# Patient Record
Sex: FEMALE | Race: WHITE
[De-identification: names, ages, dates, MRNs, and addresses within clinical notes are randomized per-mention and may not be internally consistent; named-entity substitution may affect disease eponyms.]

---

## 2019-03-20 ENCOUNTER — HOSPITAL ENCOUNTER (EMERGENCY)
Dept: HOSPITAL 63 - ER | Age: 84
Discharge: HOME | End: 2019-03-20
Payer: MEDICARE

## 2019-03-20 VITALS
DIASTOLIC BLOOD PRESSURE: 44 MMHG | SYSTOLIC BLOOD PRESSURE: 126 MMHG | SYSTOLIC BLOOD PRESSURE: 126 MMHG | DIASTOLIC BLOOD PRESSURE: 44 MMHG | SYSTOLIC BLOOD PRESSURE: 126 MMHG | DIASTOLIC BLOOD PRESSURE: 44 MMHG

## 2019-03-20 VITALS — HEIGHT: 62 IN | BODY MASS INDEX: 26.5 KG/M2 | WEIGHT: 144 LBS

## 2019-03-20 DIAGNOSIS — N39.0: ICD-10-CM

## 2019-03-20 DIAGNOSIS — Z88.0: ICD-10-CM

## 2019-03-20 DIAGNOSIS — K80.20: Primary | ICD-10-CM

## 2019-03-20 DIAGNOSIS — Z91.012: ICD-10-CM

## 2019-03-20 DIAGNOSIS — K42.9: ICD-10-CM

## 2019-03-20 DIAGNOSIS — Z91.013: ICD-10-CM

## 2019-03-20 DIAGNOSIS — Z90.81: ICD-10-CM

## 2019-03-20 DIAGNOSIS — Z90.710: ICD-10-CM

## 2019-03-20 LAB
% ATYL: 4 % (ref 0–0)
% LYMPHS: 43 % (ref 24–48)
% MONOS: 13 % (ref 0–10)
% SEGS: 38 % (ref 35–66)
ACANTHOCYTES BLD QL SMEAR: (no result)
ALBUMIN SERPL-MCNC: 3.9 G/DL (ref 3.4–5)
ALBUMIN/GLOB SERPL: 1.1 {RATIO} (ref 1–1.7)
ALP SERPL-CCNC: 149 U/L (ref 46–116)
ALT SERPL-CCNC: 29 U/L (ref 14–59)
ANION GAP SERPL CALC-SCNC: 11 MMOL/L (ref 6–14)
ANISOCYTOSIS BLD QL SMEAR: SLIGHT
APTT PPP: YELLOW S
AST SERPL-CCNC: 40 U/L (ref 15–37)
BACTERIA #/AREA URNS HPF: (no result) /HPF
BASOPHILS # BLD AUTO: 0.1 X10^3/UL (ref 0–0.2)
BASOPHILS NFR BLD AUTO: 1 % (ref 0–3)
BASOPHILS NFR BLD: 1 % (ref 0–3)
BILIRUB SERPL-MCNC: 0.3 MG/DL (ref 0.2–1)
BILIRUB UR QL STRIP: (no result)
BUN/CREAT SERPL: 14 (ref 6–20)
CA-I SERPL ISE-MCNC: 18 MG/DL (ref 7–20)
CALCIUM SERPL-MCNC: 9.8 MG/DL (ref 8.5–10.1)
CHLORIDE SERPL-SCNC: 104 MMOL/L (ref 98–107)
CO2 SERPL-SCNC: 27 MMOL/L (ref 21–32)
CREAT SERPL-MCNC: 1.3 MG/DL (ref 0.6–1)
EOSINOPHIL NFR BLD AUTO: 1 % (ref 0–5)
EOSINOPHIL NFR BLD: 0.2 X10^3/UL (ref 0–0.7)
EOSINOPHIL NFR BLD: 4 % (ref 0–3)
ERYTHROCYTE [DISTWIDTH] IN BLOOD BY AUTOMATED COUNT: 16.7 % (ref 11.5–14.5)
FIBRINOGEN PPP-MCNC: (no result) MG/DL
GFR SERPLBLD BASED ON 1.73 SQ M-ARVRAT: 39.3 ML/MIN
GLOBULIN SER-MCNC: 3.6 G/DL (ref 2.2–3.8)
GLUCOSE SERPL-MCNC: 92 MG/DL (ref 70–99)
GLUCOSE UR STRIP-MCNC: (no result) MG/DL
HCT VFR BLD CALC: 38 % (ref 36–47)
HGB BLD-MCNC: 12.4 G/DL (ref 12–15.5)
HYPOCHROMIA BLD QL SMEAR: SLIGHT
LIPASE: 263 U/L (ref 73–393)
LYMPHOCYTES # BLD: 2.6 X10^3/UL (ref 1–4.8)
LYMPHOCYTES NFR BLD AUTO: 50 % (ref 24–48)
MCH RBC QN AUTO: 29 PG (ref 25–35)
MCHC RBC AUTO-ENTMCNC: 33 G/DL (ref 31–37)
MCV RBC AUTO: 90 FL (ref 79–100)
MONO #: 0.8 X10^3/UL (ref 0–1.1)
MONOCYTES NFR BLD: 15 % (ref 0–9)
NEUT #: 1.6 X10^3UL (ref 1.8–7.7)
NEUTROPHILS NFR BLD AUTO: 30 % (ref 31–73)
NITRITE UR QL STRIP: (no result)
PLATELET # BLD AUTO: 271 X10^3/UL (ref 140–400)
PLATELET # BLD EST: ADEQUATE 10*3/UL
POIKILOCYTOSIS BLD QL SMEAR: (no result)
POTASSIUM SERPL-SCNC: 4 MMOL/L (ref 3.5–5.1)
PROT SERPL-MCNC: 7.5 G/DL (ref 6.4–8.2)
RBC # BLD AUTO: 4.23 X10^6/UL (ref 3.5–5.4)
RBC #/AREA URNS HPF: 0 /HPF (ref 0–2)
SODIUM SERPL-SCNC: 142 MMOL/L (ref 136–145)
SP GR UR STRIP: 1.02
SQUAMOUS #/AREA URNS LPF: (no result) /LPF
TARGETS BLD QL SMEAR: (no result)
UROBILINOGEN UR-MCNC: 1 MG/DL
WBC # BLD AUTO: 5.3 X10^3/UL (ref 4–11)
WBC #/AREA URNS HPF: (no result) /HPF (ref 0–4)

## 2019-03-20 PROCEDURE — 80053 COMPREHEN METABOLIC PANEL: CPT

## 2019-03-20 PROCEDURE — 83690 ASSAY OF LIPASE: CPT

## 2019-03-20 PROCEDURE — 85025 COMPLETE CBC W/AUTO DIFF WBC: CPT

## 2019-03-20 PROCEDURE — 96374 THER/PROPH/DIAG INJ IV PUSH: CPT

## 2019-03-20 PROCEDURE — 36415 COLL VENOUS BLD VENIPUNCTURE: CPT

## 2019-03-20 PROCEDURE — 85007 BL SMEAR W/DIFF WBC COUNT: CPT

## 2019-03-20 PROCEDURE — 85610 PROTHROMBIN TIME: CPT

## 2019-03-20 PROCEDURE — 74176 CT ABD & PELVIS W/O CONTRAST: CPT

## 2019-03-20 PROCEDURE — 87086 URINE CULTURE/COLONY COUNT: CPT

## 2019-03-20 PROCEDURE — 84484 ASSAY OF TROPONIN QUANT: CPT

## 2019-03-20 PROCEDURE — 81001 URINALYSIS AUTO W/SCOPE: CPT

## 2019-03-20 PROCEDURE — 83880 ASSAY OF NATRIURETIC PEPTIDE: CPT

## 2019-03-20 PROCEDURE — 99284 EMERGENCY DEPT VISIT MOD MDM: CPT

## 2019-03-20 PROCEDURE — 93005 ELECTROCARDIOGRAM TRACING: CPT

## 2019-03-20 PROCEDURE — 87186 SC STD MICRODIL/AGAR DIL: CPT

## 2019-03-20 PROCEDURE — 76705 ECHO EXAM OF ABDOMEN: CPT

## 2019-03-20 NOTE — RAD
Indication:nausea RUQ pain

TECHNIQUE: Grayscale, color Doppler and spectral waveform is of the 

abdomen obtained.

 

COMPARISON:CT abdomen pelvis from same day

 

FINDINGS: Visualized pancreas is within normal limits. IVC is within 

normal limits. Diffusely stone filled gallbladder is seen with wall echo 

shadow complex. No pericholecystic fluid. Main portal vein is patent with 

hepatopedal flow. CBD measures 5 mm in diameter and is top normal in size 

for patient's age. Mild diffuse intrahepatic biliary duct dilation seen. 

Liver measures 14 cm in longest dimension and is normal in size and 

echogenicity. Right kidney measures 9.5 cm in length without 

hydronephrosis. 

 

IMPRESSION:

1. Completely stone filled gallbladder. If concern for cystic duct 

obstruction is high, please consider HIDA scan.

2. Mild diffuse intrahepatic biliary duct dilation. CBD is nondilated.

 

Electronically signed by: Flip Graham DO (3/20/2019 4:33 PM) [facility]

## 2019-03-20 NOTE — RAD
EXAM: CT Abdomen and Pelvis without IV contrast

 

CLINICAL HISTORY: Right and upper sided abdominal pain, nausea today. 

 

COMPARISON: CT chest abdomen pelvis 5/26/2015

 

TECHNIQUE: Helical CT of the abdomen and pelvis without intravenous 

contrast.  Axial, coronal and sagittal reformatted images were generated.

 

PQRS compliance statement - One or more of the following individualized 

dose reduction techniques were utilized for this study:

1.  Automated exposure control

2.  Adjustment of the mA and/or kV according to patient size

3.  Use of iterative reconstruction technique

 

FINDINGS:

Lack of intravenous contrast limits evaluation of solid organs, 

vasculature, and lymph nodes. 

 

Lower chest: 

Prominently reticular opacities in the lower lobes likely 

scarring/atelectasis. Mild emphysematous changes are seen. 3-4 mm 

bilateral lung base nodules are seen, stable to prior CT 5/26/2015. Small 

hiatal hernia.

 

Abdomen and Pelvis: 

No focal liver lesion. High density material within the gallbladder likely

numerous gallstones. No definite wall thickening or pericholecystic fluid 

is seen. No definite intra or extra hepatic ductal dilatation is seen. 

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

 

Adrenal glands and pancreas are unremarkable.

 

 No definite renal tract calculus. No focal renal lesion. No 

hydronephrosis. Diffuse bladder wall thickening with associated fat 

infiltration likely cystitis.

 

Appendix is normal.  No small or large bowel dilatation. Moderate colonic 

stool content. 

 

Tiny fat-containing periumbilical hernia.

 

No abdominal or pelvic ascites. No abdominal or pelvic lymphadenopathy.

 

Bones: 

Osseous structures are grossly unremarkable. Multilevel degenerative 

changes of the spine are seen. Diffusely decreased bone mineral density. 

Anterolisthesis of L4 on L5 is seen.

 

IMPRESSION: 

Marked cholelithiasis without CT evidence for acute cholecystitis.

Bladder wall thickening and associated fat infiltration may be seen with 

cystitis. This can be correlated with urinalysis.

Moderate colonic stool content. No evidence for bowel obstruction.

 

Electronically signed by: Ac Joshi MD (3/20/2019 4:38 PM) [facility]

## 2019-03-20 NOTE — EKG
[facility] [address], [place] [address]

Test Date:    2019               Test Time:    15:01:34

Pat Name:     ADDY ATKINS          Department:   

Patient ID:   [id number]           Room:          

Gender:       F                        Technician:   RINA

:          1935               Requested By: CHAZ PRINCE

Order Number: [id number]            Reading MD:   Evin Rice MD

                                 Measurements

Intervals                              Axis          

Rate:         71                       P:            66

MS:           182                      QRS:          -34

QRSD:         84                       T:            52

QT:           378                                    

QTc:          411                                    

                           Interpretive Statements

SINUS RHYTHM

ABNORMAL LEFT AXIS DEVIATION

LEFT ANTERIOR FASCICULAR BLOCK



Electronically Signed On 3- 16:32:52 CDT by Evin Rice MD

## 2019-03-20 NOTE — PHYS DOC
Past History


Past Medical History:  Cancer


Additional Past Medical Histor:  meningitis


Past Surgical History:  Hysterectomy, Knee Replacement, Spleenectomy, Other


Additional Past Surgical Histo:  feeding tube


Smoking:  Non-smoker


Alcohol Use:  Rarely


Drug Use:  None





Adult General


Chief Complaint


Chief Complaint:  NAUSEA/VOMITING/DIARRHEA





HPI


HPI





Patient is a 83-year-old female who presents with mid upper abdominal pain 

since 21 February 2019. This is been intermittent. Sometimes associated with 

food. No specific association with fatty foods or milk. Some nausea, no vomiting

, no diarrhea. There was a fever at the onset. Patient had an exacerbation of 

the pain but more in her right upper quadrant shortly before arriving today. 

That sharp discomfort lasted only a few seconds. There is no association with 

exertion. There is been no trauma. Patient notes that she's lost approximately 

3 pounds of weight in this past month. Discomfort is mild to moderate.[]





Review of Systems


Review of Systems





Constitutional: Denies fever or chills []


Eyes: Denies change in visual acuity, redness, or eye pain []


HENT: Denies nasal congestion or sore throat []


Respiratory: Denies cough or shortness of breath []


Cardiovascular: No chest pain or palpitations[]


GI: See history of present illness[]


: Denies dysuria or hematuria []


Musculoskeletal: Denies back pain or joint pain []


Integument: Denies rash or skin lesions []


Neurologic: Denies headache, focal weakness or sensory changes []


Endocrine: Denies polyuria or polydipsia []





All other systems were reviewed and found to be within normal limits, except as 

documented in this note.





Current Medications


Current Medications





Current Medications








 Medications


  (Trade)  Dose


 Ordered  Sig/Sena  Start Time


 Stop Time Status Last Admin


Dose Admin


 


 Hyoscyamine


  (Anaspaz)  0.125 mg  1X  ONCE  3/20/19 15:00


 3/20/19 15:01 DC 3/20/19 15:01


0.125 MG


 


 Prochlorperazine


 Edisylate


  (Compazine)  5 mg  1X  ONCE  3/20/19 15:00


 3/20/19 15:01 DC 3/20/19 15:01


5 MG











Allergies


Allergies





Allergies








Coded Allergies Type Severity Reaction Last Updated Verified


 


  egg Allergy Intermediate  1/6/17 Yes


 


  shellfish derived Allergy Intermediate  1/6/17 Yes


 


  Penicillins Allergy Mild Anxiety 1/6/17 Yes











Physical Exam


Physical Exam





Constitutional: Well developed, well nourished, no acute distress, non-toxic 

appearance. []


HENT: Normocephalic, atraumatic, bilateral external ears normal, oropharynx 

moist, no oral exudates, nose normal. []


Eyes: PERRLA, EOMI, conjunctiva normal, no discharge. [] 


Neck: Normal range of motion, no tenderness, supple, no stridor. [] 


Cardiovascular:Heart rate regular rhythm, no murmur []


Lungs & Thorax:  Bilateral breath sounds clear to auscultation []


Abdomen: Bowel sounds normal, soft, mild epigastric tenderness, no rebound, no 

guarding, no rigidity, sits up without any difficulty, no McBurney's point 

tenderness, no Gunderson's sign, no masses, no pulsatile masses. [] 


Skin: Warm, dry, no erythema, no rash. [] 


Back: No tenderness, no CVA tenderness. [] 


Extremities: No tenderness, no cyanosis, no clubbing, ROM intact, no edema. [] 


Neurologic: Alert and oriented X 3, normal motor function, normal sensory 

function, no focal deficits noted. []


Psychologic: Affect normal, judgement normal, mood normal. []





Current Patient Data


Vital Signs





 Vital Signs








  Date Time  Temp Pulse Resp B/P (MAP) Pulse Ox O2 Delivery O2 Flow Rate FiO2


 


3/20/19 14:31 98.4 80 22  97 Room Air  











EKG


EKG


EKG shows a sinus rhythm at 71 bpm, left axis deviation, QTC of 411 ms, no ST 

elevation, no acute changes when compared with EKG of January 6, 2017. 

Interpreted by me at 1505[]





Radiology/Procedures


Radiology/Procedures


PROCEDURE: ABDOMEN LTD





Indication:nausea RUQ pain


TECHNIQUE: Grayscale, color Doppler and spectral waveform is of the 


abdomen obtained.


 


COMPARISON:CT abdomen pelvis from same day


 


FINDINGS: Visualized pancreas is within normal limits. IVC is within 


normal limits. Diffusely stone filled gallbladder is seen with wall echo 


shadow complex. No pericholecystic fluid. Main portal vein is patent with 


hepatopedal flow. CBD measures 5 mm in diameter and is top normal in size 


for patient's age. Mild diffuse intrahepatic biliary duct dilation seen. 


Liver measures 14 cm in longest dimension and is normal in size and 


echogenicity. Right kidney measures 9.5 cm in length without 


hydronephrosis. 


 


IMPRESSION:


1. Completely stone filled gallbladder. If concern for cystic duct 


obstruction is high, please consider HIDA scan.


2. Mild diffuse intrahepatic biliary duct dilation. CBD is nondilated.











EXAM: CT Abdomen and Pelvis without IV contrast


 


CLINICAL HISTORY: Right and upper sided abdominal pain, nausea today. 


 


COMPARISON: CT chest abdomen pelvis 5/26/2015


 


TECHNIQUE: Helical CT of the abdomen and pelvis without intravenous 


contrast.  Axial, coronal and sagittal reformatted images were generated.


 


PQRS compliance statement - One or more of the following individualized 


dose reduction techniques were utilized for this study:


1.  Automated exposure control


2.  Adjustment of the mA and/or kV according to patient size


3.  Use of iterative reconstruction technique


 


FINDINGS:


Lack of intravenous contrast limits evaluation of solid organs, 


vasculature, and lymph nodes. 


 


Lower chest: 


Prominently reticular opacities in the lower lobes likely 


scarring/atelectasis. Mild emphysematous changes are seen. 3-4 mm 


bilateral lung base nodules are seen, stable to prior CT 5/26/2015. Small 


hiatal hernia.


 


Abdomen and Pelvis: 


No focal liver lesion. High density material within the gallbladder likely


numerous gallstones. No definite wall thickening or pericholecystic fluid 


is seen. No definite intra or extra hepatic ductal dilatation is seen. 


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


 


Adrenal glands and pancreas are unremarkable.


 


 No definite renal tract calculus. No focal renal lesion. No 


hydronephrosis. Diffuse bladder wall thickening with associated fat 


infiltration likely cystitis.


 


Appendix is normal.  No small or large bowel dilatation. Moderate colonic 


stool content. 


 


Tiny fat-containing periumbilical hernia.


 


No abdominal or pelvic ascites. No abdominal or pelvic lymphadenopathy.


 


Bones: 


Osseous structures are grossly unremarkable. Multilevel degenerative 


changes of the spine are seen. Diffusely decreased bone mineral density. 


Anterolisthesis of L4 on L5 is seen.


 


IMPRESSION: 


Marked cholelithiasis without CT evidence for acute cholecystitis.


Bladder wall thickening and associated fat infiltration may be seen with 


cystitis. This can be correlated with urinalysis.


Moderate colonic stool content. No evidence for bowel obstruction.[]





Course & Med Decision Making


Course & Med Decision Making


Pertinent Labs and Imaging studies reviewed. (See chart for details)





ED course: Patient arrived, was placed in bed, and tolerated exam well. Due to 

her shellfish allergy she was given an oral water contrast which she tolerated 

well. She was transported to and from CT and ultrasound without any 

complications. After the return of the laboratory and imaging findings, these 

were discussed with her and her son-in-law both of whom voiced understanding. 

All questions were answered. Patient was discharged in improved condition.





Medical decision making: Patient appears to have cholelithiasis that is 

symptomatic. No evidence of cholecystitis, no evidence of obstruction, no 

evidence of pancreatitis nor acute coronary syndrome. No evidence of oral 

intake intolerance. No evidence of pyelonephritis.[]





Dragon Disclaimer


Dragon Disclaimer


This electronic medical record was generated, in whole or in part, using a 

voice recognition dictation system.





Departure


Departure:


Impression:  


 Primary Impression:  


 Cholelithiasis


 Additional Impression:  


 Urinary tract infection


Disposition:  01 HOME, SELF-CARE


Condition:  IMPROVED


Referrals:  


FIDEL MELTON MD (PCP)


Follow-up in 2 days





MARLO GRAYSON MD


GI specialist, call tomorrow to set follow up appointment





UNRULY MENDOZA MD


Surgeon, call tomorrow to set follow up appointment


Patient Instructions:  Cholelithiasis, Urinary Tract Infection





Additional Instructions:  


Follow-up with your primary care physician in 2 days. Call the GI specialist as 

well as ortega surgeon for follow-up of your cholelithiasis. Return to the ER if 

worsening pain, unable to tolerate liquids, or any other concerns.


Scripts


Sulfamethoxazole/Trimethoprim (BACTRIM DS TABLET) 1 Each Tablet


1 TAB PO BID for urinary tract infection, #20 TAB


   Prov: CHAZ PRINCE DO         3/20/19 


Ondansetron Hcl (ZOFRAN) 4 Mg Tablet


1 TAB PO Q6HRS for nausea or vomiting, #20 TAB


   Prov: CHAZ PRINCE DO         3/20/19 


Hyoscyamine Sulfate (LEVSIN) 0.125 Mg Tablet


0.125 MG PO QID for abdominal pain/cramping, #30 TAB


   Prov: CHAZ PRINCE DO         3/20/19





Problem Qualifiers








 Primary Impression:  


 Cholelithiasis


 Cholelithiasis location:  gallbladder  Cholecystitis presence:  without 

cholecystitis  Biliary obstruction:  without biliary obstruction  Qualified 

Codes:  K80.20 - Calculus of gallbladder without cholecystitis without 

obstruction


 Additional Impression:  


 Urinary tract infection


 Urinary tract infection type:  site unspecified  Hematuria presence:  without 

hematuria  Qualified Codes:  N39.0 - Urinary tract infection, site not specified








CHAZ PRINCE DO Mar 20, 2019 15:05

## 2020-08-11 ENCOUNTER — HOSPITAL ENCOUNTER (OUTPATIENT)
Dept: HOSPITAL 63 - ECHO | Age: 85
Discharge: HOME | End: 2020-08-11
Payer: MEDICARE

## 2020-08-11 DIAGNOSIS — I34.0: Primary | ICD-10-CM

## 2020-08-11 PROCEDURE — 93306 TTE W/DOPPLER COMPLETE: CPT

## 2020-08-11 NOTE — CARD
MR#: [id number]

Account#: [id number]

Accession#: [id number]

Date of Study: 08/11/2020

Ordering Physician: KETURAH RICE, 

Referring Physician: KETURAH RICE, 

Tech: Arielle Carter





--------------- APPROVED REPORT --------------





EXAM: Two-dimensional and M-mode echocardiogram with Doppler and color Doppler.



Other Information 

Quality : AverageHR: 67bpm



INDICATION

Mitral Valve Disease 



2D DIMENSIONS 

Left Atrium(2D)3.0 (1.6-4.0cm)IVSd0.9 (0.7-1.1cm)

Aortic Root(2D)2.4 (2.0-3.7cm)LVDd4.0 (3.9-5.9cm)

LVOT Diameter1.8 (1.8-2.4cm)PWd1.0 (0.7-1.1cm)

LVDs2.6 (2.5-4.0cm)FS (%) 34.4 %

SV45.0 mlLVEF(%)64.1 (>50%)



Aortic Valve

AoV Peak Robi.144.6cm/sAoV VTI35.8cm

AO Peak GR.8.4mmHgLVOT Peak Robi.93.2cm/s

LVOT  VTI 22.76cmAO Mean GR.5mmHg

GIANNA (VMAX)1.55bd5VHW   (VTI)1.67cm2



Mitral Valve

MV E Kyfyhnix935.1cm/sMV E Peak Gr.171mmHg

MV DECEL IBZD069udHM A Dsjspajr214.5cm/s

MV E Mean Gr.3mmHgE/A  Ratio0.8



Pulmonary Valve

PV Peak Aqsilvtg17.7cm/sPV Peak Grad.4mmHg



Tricuspid Valve

TR P. Psasoiml613vo/sRAP EQXANMUS1rxDa

TR Peak Gr.88hdEvLTJC25hpPr



Pulmonary Vein

S1 Mqmqvecw02.5cm/sD2 Damrlsxp11.0cm/s



 LEFT VENTRICLE 

The left ventricle is normal size. There is normal left ventricular wall thickness. The left ventricu
lar systolic function is normal and the ejection fraction is within normal range. The Ejection Fracti
on is 50-55%. There is normal LV segmental wall motion. Transmitral Doppler flow pattern is Grade I-a
bnormal relaxation pattern.



 RIGHT VENTRICLE 

The right ventricle is normal size. There is normal right ventricular wall thickness. The right ventr
icular systolic function is normal.



 ATRIA 

The left atrium size is normal. The right atrium size is normal. The interatrial septum is intact wit
h no evidence for an atrial septal defect or patent foramen ovale as noted on 2-D or Doppler imaging.




 AORTIC VALVE 

The aortic valve is normal in structure and function. Doppler and Color Flow revealed no significant 
aortic regurgitation. There is no significant aortic valvular stenosis. Calculated aortic valve area 
is 1.9 cm2 with maximum pressure gradient of 9 mmHg and mean pressure gradient of 5 mmHg.



 MITRAL VALVE 

The mitral valve is thickened but opens well. There is no evidence of mitral valve prolapse. There is
 no mitral valve stenosis with a mean gradient of 3.04 mmHg. Doppler and Color-flow revealed moderate
 mitral regurgitation.



 TRICUSPID VALVE 

The tricuspid valve is normal in structure and function. Doppler and Color Flow revealed trace tricus
pid regurgitation with an estimated PAP of 29 mmHg. There is no tricuspid valve stenosis.



 PULMONIC VALVE 

The pulmonic valve is not well visualized. Doppler and Color Flow revealed no pulmonic valvular regur
gitation. There is no pulmonic valvular stenosis.



 GREAT VESSELS 

The aortic root is normal in size. The IVC is normal in size and collapses >50% with inspiration.



 PERICARDIAL EFFUSION 

There is no evidence of significant pericardial effusion.



Critical Notification

Critical Value: No



<Conclusion>

The left ventricular systolic function is normal and the ejection fraction is within normal range. Th
e Ejection Fraction is 50-55%.

There is normal LV segmental wall motion.

Doppler and Color-flow revealed moderate mitral regurgitation.



Signed by : Keturah Rice, 

Electronically Approved : 08/11/2020 16:18:23

## 2021-04-13 ENCOUNTER — HOSPITAL ENCOUNTER (OUTPATIENT)
Dept: HOSPITAL 63 - ECHO | Age: 86
End: 2021-04-13
Payer: MEDICARE

## 2021-04-13 DIAGNOSIS — I65.23: Primary | ICD-10-CM

## 2021-04-13 DIAGNOSIS — I73.9: ICD-10-CM

## 2021-04-13 PROCEDURE — 93880 EXTRACRANIAL BILAT STUDY: CPT

## 2021-04-13 PROCEDURE — 93306 TTE W/DOPPLER COMPLETE: CPT

## 2021-04-13 NOTE — CARD
MR#: [id number]

Account#: [id number]

Accession#: [id number]

Date of Study: 04/13/2021

Ordering Physician: KETURAH RIDLEY, 

Referring Physician: KETURAH RIDLEY, 

Tech: Rosalva Connolly [facility]





--------------- APPROVED REPORT --------------





EXAM: Two-dimensional and M-mode echocardiogram with Doppler and color Doppler.



Other Information 

Quality : Good



INDICATION

Mitral Vavle Regurgitation



2D DIMENSIONS 

RVDd2.8 (2.9-3.5cm)Left Atrium(2D)3.4 (1.6-4.0cm)

IVSd0.9 (0.7-1.1cm)Aortic Root(2D)2.4 (2.0-3.7cm)

LVDd3.7 (3.9-5.9cm)LVOT Diameter1.9 (1.8-2.4cm)

PWd0.8 (0.7-1.1cm)LVDs1.9 (2.5-4.0cm)

FS (%) 30.0 %SV46.3 ml

LVEF(%)60.0 (>50%)



Aortic Valve

AoV Peak Robi.171.1cm/sAoV VTI33.5cm

AO Peak GR.11.7mmHgLVOT Peak Robi.105.7cm/s

LVOT  VTI 22.32cmAO Mean GR.7mmHg

GIANNA (VMAX)1.39we6EQY   (VTI)1.86cm2



Mitral Valve

MV E Yzmqtkkt379.9cm/sMV DECEL XPQX029vj

MV A Lrozeair684.7cm/sE/A  Ratio0.9



Tricuspid Valve

TR P. Gvsapeib568za/sRAP GTEVEKTB3scNq

TR Peak Gr.61giRnMMWT84ntMx



Pulmonary Vein

S1 Dazpjxvm147.7cm/sD2 Mquvxnhc10.0cm/s



 LEFT VENTRICLE 

The left ventricle is normal size. There is normal left ventricular wall thickness. The left ventricu
lar systolic function is normal. The Ejection Fraction is 55-60%. There is normal LV segmental wall m
otion. Transmitral Doppler flow pattern is Grade I-abnormal relaxation pattern.



 RIGHT VENTRICLE 

The right ventricle is normal size. The right ventricular systolic function is normal.



 ATRIA 

The left atrium size is normal. The right atrium size is normal. The interatrial septum is intact wit
h no evidence for an atrial septal defect or patent foramen ovale as noted on 2-D or Doppler imaging.




 AORTIC VALVE 

The aortic valve is calcified but opens well. Doppler and Color Flow revealed no significant aortic r
egurgitation. There is no significant aortic valvular stenosis.



 MITRAL VALVE 

The mitral valve is normal in structure and function. There is no evidence of mitral valve prolapse. 
There is no mitral valve stenosis. Doppler and Color-flow revealed moderate mitral regurgitation.



 TRICUSPID VALVE 

The tricuspid valve is normal in structure and function. Doppler and Color Flow revealed trace to mil
d tricuspid regurgitation. There is mild pulmonary hypertension. The PA pressure was estimated at 36 
mmHg. There is no tricuspid valve stenosis.



 PULMONIC VALVE 

The pulmonic valve is not well visualized. Doppler and Color Flow revealed no pulmonic valvular regur
gitation. There is no pulmonic valvular stenosis.



 GREAT VESSELS 

The aortic root is normal in size.



 PERICARDIAL EFFUSION 

There is no evidence of significant pericardial effusion.



Critical Notification

Critical Value: No



<Conclusion>

The left ventricular systolic function is normal.

The Ejection Fraction is 55-60%.

There is normal LV segmental wall motion.

Transmitral Doppler flow pattern is Grade I-abnormal relaxation pattern.

Moderate mitral regurgitation.

Trace to mild tricuspid regurgitation.

The PA pressure was estimated at 36 mmHg.

There is no evidence of significant pericardial effusion.



Signed by : Jovanni Cabrera, 

Electronically Approved : 04/13/2021 11:54:30

## 2021-04-13 NOTE — RAD
MR#: [id number]

Account#: [id number]

Accession#: [id number]

Date of Study: 04/13/2021

Ordering Physician: KETURAH RICE,

Referring Physician: KETURAH RICE,

Tech: Crystal Rodrigues RONEL, [facility]





--------------- APPROVED REPORT --------------





Patient Location: OUT-PATIENT

Laterality:Bilateral



Indications

Grayscale images of the bilateral carotid vasculature demonstrates mild intimal hyperplasia but no si
gnificant plaque is noted.  On the right side based on velocity criteria there is moderate stenosis i
nvolving the distal internal carotid artery although overall based on lower diastolic velocities this
 appears to be less than 50%.  Normal ICA to CCA ratios are noted.  Normal antegrade vertebral veloci
ties are noted.



On the left side again there is mild to moderate stenosis based on velocity criteria in the mid inter
nal carotid artery consistent with a 50 to 69% stenosis.  The vertebral velocities are antegrade.  No
 focal high-grade stenosis is identified otherwise with normal ICA to CCA ratios.



Risk Factors

PAD

Smoking



Doppler Spectral Velocity Analysis

Right   Left    

pCCA     116/20 cm/spCCA     106/26 cm/s

mCCA     101/18 cm/smCCA     98/20 cm/s

dCCA     80/16 cm/sdCCA     86/22 cm/s

ECA      103/7 cm/sECA      83/7 cm/s

pICA     963/21 cm/spICA     116/35 cm/s

Amber     101/37 cm/smICA     133/29 cm/s

dICA     151/29 cm/sdICA     132/29 cm/s

Vert.    86/21 cm/sVert.    53/14 cm/s

ICA/CCA  8.30ICA/CCA  1.25



Critical Notification

Critical Value: No



<Conclusion>

1.  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  Overall no significant steno
sis.



Signed by : Keturah Rice, 

Electronically Approved : 04/13/2021 11:34:57

## 2021-06-16 ENCOUNTER — HOSPITAL ENCOUNTER (OUTPATIENT)
Dept: HOSPITAL 63 - LAB | Age: 86
Discharge: HOME | End: 2021-06-16
Attending: SPECIALIST
Payer: MEDICARE

## 2021-06-16 VITALS
DIASTOLIC BLOOD PRESSURE: 68 MMHG | SYSTOLIC BLOOD PRESSURE: 124 MMHG | SYSTOLIC BLOOD PRESSURE: 124 MMHG | DIASTOLIC BLOOD PRESSURE: 68 MMHG | SYSTOLIC BLOOD PRESSURE: 124 MMHG | DIASTOLIC BLOOD PRESSURE: 68 MMHG

## 2021-06-16 VITALS — SYSTOLIC BLOOD PRESSURE: 119 MMHG | DIASTOLIC BLOOD PRESSURE: 63 MMHG

## 2021-06-16 VITALS — DIASTOLIC BLOOD PRESSURE: 62 MMHG | SYSTOLIC BLOOD PRESSURE: 124 MMHG

## 2021-06-16 VITALS — SYSTOLIC BLOOD PRESSURE: 119 MMHG | DIASTOLIC BLOOD PRESSURE: 61 MMHG

## 2021-06-16 DIAGNOSIS — E03.9: ICD-10-CM

## 2021-06-16 DIAGNOSIS — D64.9: Primary | ICD-10-CM

## 2021-06-16 LAB
HCT VFR BLD CALC: 21.5 % (ref 36–47)
HGB BLD-MCNC: 6.3 G/DL (ref 12–15.5)
MCHC RBC AUTO-ENTMCNC: 29 G/DL (ref 31–37)

## 2021-06-16 PROCEDURE — 85018 HEMOGLOBIN: CPT

## 2021-06-16 PROCEDURE — 86920 COMPATIBILITY TEST SPIN: CPT

## 2021-06-16 PROCEDURE — 36430 TRANSFUSION BLD/BLD COMPNT: CPT

## 2021-06-16 PROCEDURE — 85014 HEMATOCRIT: CPT

## 2021-06-16 PROCEDURE — 86850 RBC ANTIBODY SCREEN: CPT

## 2021-06-16 PROCEDURE — 86901 BLOOD TYPING SEROLOGIC RH(D): CPT

## 2021-06-16 PROCEDURE — P9016 RBC LEUKOCYTES REDUCED: HCPCS

## 2021-06-16 PROCEDURE — 86900 BLOOD TYPING SEROLOGIC ABO: CPT

## 2021-06-16 PROCEDURE — 36415 COLL VENOUS BLD VENIPUNCTURE: CPT

## 2021-06-16 NOTE — NUR
PATIENT ARRIVED TO THE UNIT VIA AMBULATION TO ROOM 125 ACCOMPANIED BY HER DAUGHTER. PATIENT 
IS ALERT AND ORIENTED. VS ARE WNL. BLOOD TRANSFUSION STARTED AT 1620. TUBING WAS PRIMED WITH 
NORMAL SALINE AND THEN PRIMED WITH BLOOD. TRANSFUSION STARTED AT 75 ML/HRS. PATIENT WAS 
MONITORED CLOSELY BY 15 MINS. NO REACTION WAS NOTED AT THIS TIME SO WILL INCREASE INFUSION 
RATE TO 150MLS/HR AND CONTINUE TO MONITOR PATIENT.

## 2021-06-16 NOTE — NUR
TRANSFUSION WAS COMPLETED WITH NO COMPLICATIONS OR REACTIONS NOTED. IV WAS REMOVED. PATIENT 
LEFT THE UNIT VIA AMBULATION ACCOMPANIED BY HER DAUGHTER.

## 2021-10-01 ENCOUNTER — HOSPITAL ENCOUNTER (OUTPATIENT)
Dept: HOSPITAL 63 - RAD | Age: 86
End: 2021-10-01
Attending: SPECIALIST
Payer: MEDICARE

## 2021-10-01 DIAGNOSIS — M25.461: ICD-10-CM

## 2021-10-01 DIAGNOSIS — M17.11: Primary | ICD-10-CM

## 2021-10-01 PROCEDURE — 73562 X-RAY EXAM OF KNEE 3: CPT

## 2022-04-05 ENCOUNTER — HOSPITAL ENCOUNTER (OUTPATIENT)
Dept: HOSPITAL 63 - ECHO | Age: 87
End: 2022-04-05
Payer: MEDICARE

## 2022-04-05 DIAGNOSIS — I34.0: ICD-10-CM

## 2022-04-05 DIAGNOSIS — Z86.73: ICD-10-CM

## 2022-04-05 DIAGNOSIS — I65.23: Primary | ICD-10-CM

## 2022-04-05 PROCEDURE — 93880 EXTRACRANIAL BILAT STUDY: CPT

## 2022-04-05 PROCEDURE — 93306 TTE W/DOPPLER COMPLETE: CPT

## 2022-04-05 NOTE — CARD
MR#: [id number]

Account#: [id number]

Accession#: [id number]

Date of Study: 04/05/2022

Ordering Physician: KETURAH RIDLEY, 

Referring Physician: KETURAH RIDLEY, 

Tech: Lai Fagan [facility]





--------------- APPROVED REPORT --------------





EXAM: Two-dimensional and M-mode echocardiogram with Doppler and color Doppler.



Other Information 

Quality : GoodHR: 80bpm

Rhythm : NSR



INDICATION

Murmur 

PAD



2D DIMENSIONS 

Left Atrium(2D)3.7 (1.6-4.0cm)IVSd0.9 (0.7-1.1cm)

Aortic Root(2D)2.3 (2.0-3.7cm)LVDd4.0 (3.9-5.9cm)

LVOT Diameter1.8 (1.8-2.4cm)PWd0.9 (0.7-1.1cm)

LA Urtbbd15 (18-58mL)LVDs2.3 (2.5-4.0cm)

FS (%) 42.9 %SV53.4 ml

LVEF(%)74.5 (>50%)



Aortic Valve

AoV Peak Robi.146.5cm/sAoV VTI31.9cm

AO Peak GR.8.6mmHgLVOT Peak Robi.110.1cm/s

LVOT  VTI 21.62cmAO Mean GR.5mmHg

GIANNA (VMAX)1.22hf2JOZ   (VTI)1.64cm2



Mitral Valve

MV E Yhphsvrx19.2cm/sMV E Peak Gr.9mmHg

MV DECEL NIPX770iiFM A Xazcfcjm642.0cm/s

MV E Mean Gr.3mmHgE/A  Ratio0.7



Pulmonary Valve

PV Peak Amsxirxs865.7cm/sPV Peak Grad.4mmHg



Tricuspid Valve

TR P. Nycgcnqe600oq/sTR Peak Gr.20mmHg



Pulmonary Vein

S1 Akaeofds55.9cm/sD2 Bvphoxvc53.3cm/s



 LEFT VENTRICLE 

The left ventricle is normal size. There is normal left ventricular wall thickness. The left ventricu
lar systolic function is normal. The ejection fraction is estimated at 55-60%. There is normal LV seg
mental wall motion. Transmitral Doppler flow pattern is Grade I-abnormal relaxation pattern. No left 
ventricle thrombus noted on this study. There is no ventricular septal defect visualized. There is no
 left ventricular aneurysm. There is no mass noted in the left ventricle.



 RIGHT VENTRICLE 

The right ventricle is normal size. There is normal right ventricular wall thickness. The right ventr
icular systolic function is normal.



 ATRIA 

The left atrium is moderately dilated. The right atrium size is normal. The interatrial septum is int
act with no evidence for an atrial septal defect or patent foramen ovale as noted on 2-D or Doppler i
maging.



 AORTIC VALVE 

The aortic valve is normal in structure and function. Doppler and Color Flow revealed no significant 
aortic regurgitation. There is no significant aortic valvular stenosis. There is no aortic valvular v
egetation.



 MITRAL VALVE 

The mitral valve is thickened but opens well. There is no evidence of mitral valve prolapse. There is
 no mitral valve stenosis. Calculated mitral valve area is 1.4 cm2 with maximum pressure gradient of 
9 mmHg and mean pressure gradient of 4 mmHg. Doppler and Color-flow revealed moderate mitral regurgit
ation.



 TRICUSPID VALVE 

The tricuspid valve is normal in structure and function. Doppler and Color Flow revealed trace tricus
pid regurgitation. The PA pressure was estimated at 25 mmHg. There is no tricuspid valve prolapse or 
vegetation. There is no tricuspid valve stenosis.



 PULMONIC VALVE 

The pulmonary valve is normal in structure and function. Doppler and Color Flow revealed no pulmonic 
valvular regurgitation. There is no pulmonic valvular stenosis.



 GREAT VESSELS 

The aortic root is normal in size. The ascending aorta is normal in size. The pulmonary artery is nor
mal. The IVC is normal in size and collapses >50% with inspiration.



 PERICARDIAL EFFUSION 

There is no pleural effusion. There is no evidence of significant pericardial effusion.



Critical Notification

Critical Value: No



<Conclusion>

The left ventricular systolic function is normal.

The ejection fraction is estimated at 55-60%.

There is normal LV segmental wall motion.

Transmitral Doppler flow pattern is Grade I-abnormal relaxation pattern.

Moderate mitral regurgitation.

Trace tricuspid regurgitation.

The PA pressure was estimated at 25 mmHg.

There is no evidence of significant pericardial effusion.



Signed by : Jovanni Cabrera, 

Electronically Approved : 04/05/2022 15:51:00

## 2022-04-09 NOTE — RAD
MR#: [id number]

Account#: [id number]

Accession#: [id number]

Date of Study: 04/05/2022

Ordering Physician: KETURAH RIDLEY,

Referring Physician: KETURAH RIDLEY,

Tech: Crystal Rodrigues RVT,TAVIA





--------------- APPROVED REPORT --------------





Patient Location: OUT-PATIENT

Laterality:Bilateral



Indications

CVA/TIA: 



Risk Factors

Smoking



Doppler Spectral Velocity Analysis

Right   Left    

pCCA     99/12 cm/spCCA     75/15 cm/s

mCCA     86/13 cm/smCCA     75/16 cm/s

dCCA     74/11 cm/sdCCA     73/21 cm/s

ECA      99/9 cm/sECA      98/7 cm/s

pICA     76/20 cm/spICA     61/20 cm/s

Amber     89/25 cm/smICA     104/30 cm/s

dICA     101/26 cm/sdICA     93/26 cm/s

Vert.    61/14 cm/sVert.    41/10 cm/s

ICA/CCA  1.02ICA/CCA  1.39



Critical Notification

Critical Value: No



<Conclusion>

FINDINGS

Grayscale images of extracranial carotid arteries bilaterally showed mild diffuse atherosclerosis.  S
pectral waveform and color duplex analysis showed normal velocities bilaterally suggestive of 0 to le
ss than 50% stenosis.  The ICA to CCA ratio is very normal bilaterally.  The vertebral arteries bilat
erally showed antegrade flow with normal velocities.  No significant carotid artery stenosis was note
d.



CONCLUSIONS

Carotid arterial duplex scan did not show any significant carotid artery stenosis.



Signed by : Jovanni Cabrera, 

Electronically Approved : 04/09/2022 17:06:40